# Patient Record
Sex: FEMALE | Race: WHITE | URBAN - METROPOLITAN AREA
[De-identification: names, ages, dates, MRNs, and addresses within clinical notes are randomized per-mention and may not be internally consistent; named-entity substitution may affect disease eponyms.]

---

## 2017-11-08 ENCOUNTER — APPOINTMENT (OUTPATIENT)
Dept: URBAN - METROPOLITAN AREA CLINIC 211 | Age: 42
Setting detail: DERMATOLOGY
End: 2017-11-09

## 2017-11-08 DIAGNOSIS — L70.0 ACNE VULGARIS: ICD-10-CM

## 2017-11-08 DIAGNOSIS — L82.0 INFLAMED SEBORRHEIC KERATOSIS: ICD-10-CM

## 2017-11-08 PROCEDURE — 17110 DESTRUCT B9 LESION 1-14: CPT

## 2017-11-08 PROCEDURE — OTHER COUNSELING: OTHER

## 2017-11-08 PROCEDURE — OTHER PRESCRIPTION: OTHER

## 2017-11-08 PROCEDURE — OTHER MIPS QUALITY: OTHER

## 2017-11-08 PROCEDURE — OTHER LIQUID NITROGEN: OTHER

## 2017-11-08 PROCEDURE — 99202 OFFICE O/P NEW SF 15 MIN: CPT | Mod: 25

## 2017-11-08 RX ORDER — TRETINOIN 0.5 MG/G
CREAM TOPICAL
Qty: 1 | Refills: 2 | Status: ERX | COMMUNITY
Start: 2017-11-08

## 2017-11-08 ASSESSMENT — LOCATION SIMPLE DESCRIPTION DERM
LOCATION SIMPLE: LEFT CHEEK
LOCATION SIMPLE: RIGHT CHEEK

## 2017-11-08 ASSESSMENT — LOCATION DETAILED DESCRIPTION DERM
LOCATION DETAILED: RIGHT LATERAL BUCCAL CHEEK
LOCATION DETAILED: RIGHT INFERIOR CENTRAL MALAR CHEEK
LOCATION DETAILED: LEFT INFERIOR CENTRAL MALAR CHEEK

## 2017-11-08 ASSESSMENT — LOCATION ZONE DERM: LOCATION ZONE: FACE

## 2017-11-08 NOTE — PROCEDURE: MIPS QUALITY
Quality 226: Preventive Care And Screening: Tobacco Use: Screening And Cessation Intervention: Patient screened for tobacco and never smoked
Quality 131: Pain Assessment And Follow-Up: Pain assessment documented as positive using a standardized tool AND a follow-up plan is documented
Quality 110: Preventive Care And Screening: Influenza Immunization: Influenza Immunization previously received during influenza season
Quality 400a: One-Time Screening For Hepatitis C Virus (Hcv) For All Patients: Patient received one-time screening for HCV infection
Detail Level: Detailed
Quality 431: Preventive Care And Screening: Unhealthy Alcohol Use - Screening: Patient screened for unhealthy alcohol use using a single question and scores less than 2 times per year
Quality 130: Documentation Of Current Medications In The Medical Record: Current Medications Documented

## 2017-11-08 NOTE — HPI: SKIN LESION
How Severe Is Your Skin Lesion?: moderate
Has Your Skin Lesion Been Treated?: not been treated
Is This A New Presentation, Or A Follow-Up?: Skin Lesion
Additional History: Pain 0 out of 10.

## 2018-10-16 ENCOUNTER — APPOINTMENT (OUTPATIENT)
Dept: URBAN - METROPOLITAN AREA CLINIC 220 | Age: 43
Setting detail: DERMATOLOGY
End: 2018-10-17

## 2018-10-16 DIAGNOSIS — Z48.02 ENCOUNTER FOR REMOVAL OF SUTURES: ICD-10-CM

## 2018-10-16 DIAGNOSIS — S0182XA OPEN WOUND OF OTHER AND MULTIPLE SITES OF FACE, COMPLICATED: ICD-10-CM

## 2018-10-16 DIAGNOSIS — L82.1 OTHER SEBORRHEIC KERATOSIS: ICD-10-CM

## 2018-10-16 DIAGNOSIS — D485 NEOPLASM OF UNCERTAIN BEHAVIOR OF SKIN: ICD-10-CM

## 2018-10-16 PROBLEM — S21.001A UNSPECIFIED OPEN WOUND OF RIGHT BREAST, INITIAL ENCOUNTER: Status: ACTIVE | Noted: 2018-10-16

## 2018-10-16 PROBLEM — D48.5 NEOPLASM OF UNCERTAIN BEHAVIOR OF SKIN: Status: ACTIVE | Noted: 2018-10-16

## 2018-10-16 PROCEDURE — OTHER BIOPSY BY SHAVE METHOD: OTHER

## 2018-10-16 PROCEDURE — OTHER PRESCRIPTION: OTHER

## 2018-10-16 PROCEDURE — 11100: CPT

## 2018-10-16 PROCEDURE — OTHER OTHER: OTHER

## 2018-10-16 PROCEDURE — OTHER COUNSELING: OTHER

## 2018-10-16 PROCEDURE — OTHER TREATMENT REGIMEN: OTHER

## 2018-10-16 PROCEDURE — OTHER REASSURANCE: OTHER

## 2018-10-16 PROCEDURE — OTHER MIPS QUALITY: OTHER

## 2018-10-16 PROCEDURE — 99214 OFFICE O/P EST MOD 30 MIN: CPT | Mod: 25

## 2018-10-16 RX ORDER — DOXYCYCLINE HYCLATE 100 MG/1
CAPSULE, GELATIN COATED ORAL
Qty: 28 | Refills: 0 | Status: ERX | COMMUNITY
Start: 2018-10-16

## 2018-10-16 ASSESSMENT — LOCATION ZONE DERM: LOCATION ZONE: TRUNK

## 2018-10-16 ASSESSMENT — LOCATION DETAILED DESCRIPTION DERM
LOCATION DETAILED: RIGHT INFRAMAMMARY CREASE (OUTER QUADRANT)
LOCATION DETAILED: RIGHT INFRAMAMMARY CREASE (INNER QUADRANT)
LOCATION DETAILED: LEFT SUPERIOR LATERAL UPPER BACK
LOCATION DETAILED: RIGHT LATERAL BREAST 6-7:00 REGION

## 2018-10-16 ASSESSMENT — LOCATION SIMPLE DESCRIPTION DERM
LOCATION SIMPLE: LEFT UPPER BACK
LOCATION SIMPLE: RIGHT BREAST

## 2018-10-16 NOTE — PROCEDURE: BIOPSY BY SHAVE METHOD
Cryotherapy Text: The wound bed was treated with cryotherapy after the biopsy was performed.
Destruction After The Procedure: No
X Size Of Lesion In Cm: 0
Electrodesiccation And Curettage Text: The wound bed was treated with electrodesiccation and curettage after the biopsy was performed.
Was A Bandage Applied: Yes
Silver Nitrate Text: The wound bed was treated with silver nitrate after the biopsy was performed.
Type Of Destruction Used: Curettage
Billing Type: Third-Party Bill
Biopsy Method: Dermablade
Size Of Lesion In Cm: 0.5
Hemostasis: Aluminum Chloride
Dressing: bandage
Anesthesia Type: 1% lidocaine with epinephrine
Detail Level: Detailed
Post-Care Instructions: I reviewed with the patient in detail post-care instructions. Patient is to keep the biopsy site dry overnight, and then apply bacitracin twice daily until healed. Patient may apply hydrogen peroxide soaks to remove any crusting.
Curettage Text: The wound bed was treated with curettage after the biopsy was performed.
Consent: Written consent was obtained and risks were reviewed including but not limited to scarring, infection, bleeding, scabbing, incomplete removal, nerve damage and allergy to anesthesia.
Electrodesiccation Text: The wound bed was treated with electrodesiccation after the biopsy was performed.
Wound Care: Vaseline
Notification Instructions: Patient will be notified of biopsy results. However, patient instructed to call the office if not contacted within 2 weeks.
Anesthesia Volume In Cc (Will Not Render If 0): 0.4
Biopsy Type: H and E
Depth Of Biopsy: dermis

## 2018-10-16 NOTE — PROCEDURE: OTHER
Detail Level: Zone
Note Text (......Xxx Chief Complaint.): This diagnosis correlates with the
Other (Free Text): Bacterial culture performed on her right inframmamary.

## 2018-10-16 NOTE — PROCEDURE: MIPS QUALITY
Quality 110: Preventive Care And Screening: Influenza Immunization: Influenza Immunization not Administered for Documented Reasons.
Quality 431: Preventive Care And Screening: Unhealthy Alcohol Use - Screening: Patient screened for unhealthy alcohol use using a single question and scores less than 2 times per year
Quality 265: Biopsy Follow-Up: Biopsy results reviewed, communicated, tracked, and documented
Quality 226: Preventive Care And Screening: Tobacco Use: Screening And Cessation Intervention: Patient screened for tobacco and never smoked
Detail Level: Detailed
Quality 131: Pain Assessment And Follow-Up: Pain assessment using a standardized tool is documented as negative, no follow-up plan required
Quality 130: Documentation Of Current Medications In The Medical Record: Current Medications Documented

## 2018-10-16 NOTE — PROCEDURE: TREATMENT REGIMEN
Detail Level: Zone
Plan: Advised follow up with plastic surgeon- she has pending appointment next Monday
Initiate Treatment: Doxycycline Hyclate 100mg 1 PO BID for 2 weeks (Pieter Flaherty)
Detail Level: Detailed

## 2018-11-13 ENCOUNTER — APPOINTMENT (OUTPATIENT)
Dept: URBAN - METROPOLITAN AREA CLINIC 211 | Age: 43
Setting detail: DERMATOLOGY
End: 2018-11-14

## 2018-11-13 PROBLEM — C44.619 BASAL CELL CARCINOMA OF SKIN OF LEFT UPPER LIMB, INCLUDING SHOULDER: Status: ACTIVE | Noted: 2018-11-13

## 2018-11-13 PROCEDURE — 17262 DSTRJ MAL LES T/A/L 1.1-2.0: CPT

## 2018-11-13 PROCEDURE — OTHER MIPS QUALITY: OTHER

## 2018-11-13 PROCEDURE — OTHER COUNSELING: OTHER

## 2018-11-13 PROCEDURE — OTHER CURETTAGE AND DESTRUCTION: OTHER

## 2018-11-13 NOTE — PROCEDURE: CURETTAGE AND DESTRUCTION
Bill As A Line Item Or As Units: Line Item
What Was Performed First?: Destruction
Size Of Lesion In Cm: 0.7
Post-Care Instructions: I reviewed with the patient in detail post-care instructions. Patient is to keep the area dry for 48 hours, and not to engage in any swimming until the area is healed. Should the patient develop any fevers, chills, bleeding, severe pain patient will contact the office immediately.
Bill For Surgical Tray: no
Concentration (Mg/Ml Or Millions Of Plaque Forming Units/Cc): 0.01
Additional Information: (Optional): The wound was cleaned, and a pressure dressing was applied.  The patient received detailed post-op instructions.
Consent was obtained from the patient. The risks, benefits and alternatives to therapy were discussed in detail. Specifically, the risks of infection, scarring, bleeding, prolonged wound healing, nerve injury, incomplete removal, allergy to anesthesia and recurrence were addressed. Alternatives to ED&C, such as: surgical removal and XRT were also discussed.  Prior to the procedure, the treatment site was clearly identified and confirmed by the patient. All components of Universal Protocol/PAUSE Rule completed.
Anesthesia Volume In Cc: 2
Size Of Lesion After Curettage: 1.1
Render Post-Care Instructions In Note?: yes
Total Volume (Ccs): 1
Anesthesia Type: 1% lidocaine with epinephrine
Cautery Type: electrodesiccation
Detail Level: Detailed

## 2018-11-13 NOTE — PROCEDURE: MIPS QUALITY
Quality 110: Preventive Care And Screening: Influenza Immunization: Influenza immunization was not ordered or administered, reason not given
Detail Level: Detailed
Quality 431: Preventive Care And Screening: Unhealthy Alcohol Use - Screening: Patient screened for unhealthy alcohol use using a single question and scores less than 2 times per year
Quality 130: Documentation Of Current Medications In The Medical Record: Current Medications Documented
Quality 131: Pain Assessment And Follow-Up: Pain assessment using a standardized tool is documented as negative, no follow-up plan required